# Patient Record
Sex: MALE | Race: WHITE | NOT HISPANIC OR LATINO
[De-identification: names, ages, dates, MRNs, and addresses within clinical notes are randomized per-mention and may not be internally consistent; named-entity substitution may affect disease eponyms.]

---

## 2017-07-06 ENCOUNTER — APPOINTMENT (OUTPATIENT)
Dept: PULMONOLOGY | Facility: CLINIC | Age: 72
End: 2017-07-06

## 2017-07-06 RX ORDER — AZITHROMYCIN 250 MG/1
250 TABLET, FILM COATED ORAL
Qty: 30 | Refills: 5 | Status: DISCONTINUED | COMMUNITY
Start: 2017-01-04 | End: 2017-07-06

## 2017-07-06 RX ORDER — AZITHROMYCIN 250 MG/1
250 TABLET, FILM COATED ORAL DAILY
Qty: 30 | Refills: 5 | Status: DISCONTINUED | COMMUNITY
Start: 2017-01-04 | End: 2017-07-06

## 2017-07-12 ENCOUNTER — APPOINTMENT (OUTPATIENT)
Dept: PULMONOLOGY | Facility: CLINIC | Age: 72
End: 2017-07-12

## 2017-08-03 ENCOUNTER — APPOINTMENT (OUTPATIENT)
Dept: PULMONOLOGY | Facility: CLINIC | Age: 72
End: 2017-08-03
Payer: MEDICARE

## 2017-08-03 VITALS
HEART RATE: 55 BPM | WEIGHT: 208 LBS | DIASTOLIC BLOOD PRESSURE: 84 MMHG | OXYGEN SATURATION: 89 % | BODY MASS INDEX: 26 KG/M2 | RESPIRATION RATE: 18 BRPM | SYSTOLIC BLOOD PRESSURE: 142 MMHG

## 2017-08-03 DIAGNOSIS — J44.1 CHRONIC OBSTRUCTIVE PULMONARY DISEASE WITH (ACUTE) EXACERBATION: ICD-10-CM

## 2017-08-03 PROCEDURE — 99214 OFFICE O/P EST MOD 30 MIN: CPT

## 2017-08-03 RX ORDER — ALBUTEROL SULFATE 2.5 MG/3ML
(2.5 MG/3ML) SOLUTION RESPIRATORY (INHALATION)
Qty: 0 | Refills: 0 | Status: COMPLETED | OUTPATIENT
Start: 2017-08-03

## 2017-08-03 RX ORDER — DEXAMETHASONE SODIUM PHOSPHATE 4 MG/ML
4 INJECTION, SOLUTION INTRAMUSCULAR; INTRAVENOUS
Qty: 0 | Refills: 0 | Status: COMPLETED | OUTPATIENT
Start: 2017-08-03

## 2017-08-03 RX ADMIN — Medication 0 MG/ML: at 00:00

## 2017-08-03 RX ADMIN — ALBUTEROL SULFATE 0 0.083%: 2.5 SOLUTION RESPIRATORY (INHALATION) at 00:00

## 2017-10-06 ENCOUNTER — APPOINTMENT (OUTPATIENT)
Dept: PULMONOLOGY | Facility: CLINIC | Age: 72
End: 2017-10-06
Payer: MEDICARE

## 2017-10-12 ENCOUNTER — APPOINTMENT (OUTPATIENT)
Dept: PULMONOLOGY | Facility: CLINIC | Age: 72
End: 2017-10-12
Payer: MEDICARE

## 2017-10-12 VITALS — HEART RATE: 58 BPM | OXYGEN SATURATION: 90 %

## 2017-10-12 VITALS — WEIGHT: 216 LBS | SYSTOLIC BLOOD PRESSURE: 150 MMHG | DIASTOLIC BLOOD PRESSURE: 90 MMHG | BODY MASS INDEX: 27 KG/M2

## 2017-10-12 DIAGNOSIS — R05 COUGH: ICD-10-CM

## 2017-10-12 PROCEDURE — 99406 BEHAV CHNG SMOKING 3-10 MIN: CPT

## 2017-10-12 PROCEDURE — 99214 OFFICE O/P EST MOD 30 MIN: CPT | Mod: 25

## 2018-03-07 ENCOUNTER — APPOINTMENT (OUTPATIENT)
Dept: PULMONOLOGY | Facility: CLINIC | Age: 73
End: 2018-03-07

## 2018-06-07 NOTE — H&P ADULT - ASSESSMENT
72 year old female with PMHx COPD, home O2, current smoker, HTN, CAD referred for cardiac cath and possible intervention for abnormal stress test which revealed mid to basal reversible anterior defect consistent with ischemia.  Risks and benefits explained  Informed consent signed by pt

## 2018-06-07 NOTE — H&P ADULT - PMH
Carotid atherosclerosis, unspecified laterality    COPD (chronic obstructive pulmonary disease)    Dyslipidemia    Essential hypertension

## 2018-06-07 NOTE — H&P ADULT - NSHPPHYSICALEXAM_GEN_ALL_CORE
PHYSICAL EXAM:    Constitutional: NAD, well-groomed, well-developed  Neuro: Alert and oriented x 3 Gait steady Speech clear No focal deficits  Neck: No JVD No bruit  Respiratory: CTAB, decreased at bases, + cough  Cardiovascular: S1 and S2, RRR,   Gastrointestinal: BS+, soft, NT/ND  Extremities: No clubbing cyanosis or edema No varicosities  Vascular: 2+ peripheral pulses  Psychiatric: Normal mood, normal affect  Musculoskeletal: 5/5 strength b/l upper and lower extremities

## 2018-06-07 NOTE — H&P ADULT - HISTORY OF PRESENT ILLNESS
72 year old female with PMHx COPD, HTN, CAD 72 year old female with PMHx COPD, home O2, HTN, CAD 72 year old female with PMHx COPD, home O2, current smoker, HTN, CAD referred for cardiac cath and possible intervention for abnormal stress test which revealed mid to basal reversible anterior defect consistent with ischemia.

## 2018-06-07 NOTE — H&P ADULT - NSHPLABSRESULTS_GEN_ALL_CORE
16.5   7.49  )-----------( 257      ( 08 Jun 2018 07:30 )             48.3     06-08    143  |  110<H>  |  18  ----------------------------<  96  4.2   |  27  |  0.93    Ca    8.5      08 Jun 2018 07:30    PET: stress test which revealed mid to basal reversible anterior defect consistent with ischemia.    EKG:

## 2018-06-07 NOTE — H&P ADULT - NSHPREVIEWOFSYSTEMS_GEN_ALL_CORE
REVIEW OF SYSTEMS:    CONSTITUTIONAL: No weakness, fevers or chills  EYES/ENT: No visual changes;  No vertigo or throat pain   NECK: No pain or stiffness  RESPIRATORY: No cough, wheezing, hemoptysis; +shortness of breath  CARDIOVASCULAR: No chest pain or palpitations  GASTROINTESTINAL: No abdominal or epigastric pain. No nausea, vomiting, or hematemesis; No diarrhea or constipation. No melena or hematochezia.  GENITOURINARY: No dysuria, frequency or hematuria  NEUROLOGICAL: No numbness or weakness  SKIN: No itching, burning, rashes, or lesions   All other review of systems is negative unless indicated above.

## 2018-06-08 ENCOUNTER — OUTPATIENT (OUTPATIENT)
Dept: OUTPATIENT SERVICES | Facility: HOSPITAL | Age: 73
LOS: 1 days | Discharge: HOME | End: 2018-06-08

## 2018-06-08 VITALS
HEART RATE: 56 BPM | RESPIRATION RATE: 16 BRPM | SYSTOLIC BLOOD PRESSURE: 176 MMHG | OXYGEN SATURATION: 96 % | TEMPERATURE: 97 F | DIASTOLIC BLOOD PRESSURE: 90 MMHG

## 2018-06-08 VITALS
HEART RATE: 65 BPM | SYSTOLIC BLOOD PRESSURE: 152 MMHG | RESPIRATION RATE: 16 BRPM | DIASTOLIC BLOOD PRESSURE: 85 MMHG | OXYGEN SATURATION: 96 %

## 2018-06-08 DIAGNOSIS — H26.9 UNSPECIFIED CATARACT: Chronic | ICD-10-CM

## 2018-06-08 LAB
ANION GAP SERPL CALC-SCNC: 6 MMOL/L — SIGNIFICANT CHANGE UP (ref 5–17)
BUN SERPL-MCNC: 18 MG/DL — SIGNIFICANT CHANGE UP (ref 7–23)
CALCIUM SERPL-MCNC: 8.5 MG/DL — SIGNIFICANT CHANGE UP (ref 8.5–10.1)
CHLORIDE SERPL-SCNC: 110 MMOL/L — HIGH (ref 96–108)
CO2 SERPL-SCNC: 27 MMOL/L — SIGNIFICANT CHANGE UP (ref 22–31)
CREAT SERPL-MCNC: 0.93 MG/DL — SIGNIFICANT CHANGE UP (ref 0.5–1.3)
GLUCOSE SERPL-MCNC: 96 MG/DL — SIGNIFICANT CHANGE UP (ref 70–99)
HCT VFR BLD CALC: 48.3 % — SIGNIFICANT CHANGE UP (ref 39–50)
HGB BLD-MCNC: 16.5 G/DL — SIGNIFICANT CHANGE UP (ref 13–17)
MCHC RBC-ENTMCNC: 29.7 PG — SIGNIFICANT CHANGE UP (ref 27–34)
MCHC RBC-ENTMCNC: 34.2 GM/DL — SIGNIFICANT CHANGE UP (ref 32–36)
MCV RBC AUTO: 86.9 FL — SIGNIFICANT CHANGE UP (ref 80–100)
NRBC # BLD: 0 /100 WBCS — SIGNIFICANT CHANGE UP (ref 0–0)
PLATELET # BLD AUTO: 257 K/UL — SIGNIFICANT CHANGE UP (ref 150–400)
POTASSIUM SERPL-MCNC: 4.2 MMOL/L — SIGNIFICANT CHANGE UP (ref 3.5–5.3)
POTASSIUM SERPL-SCNC: 4.2 MMOL/L — SIGNIFICANT CHANGE UP (ref 3.5–5.3)
RBC # BLD: 5.56 M/UL — SIGNIFICANT CHANGE UP (ref 4.2–5.8)
RBC # FLD: 13.7 % — SIGNIFICANT CHANGE UP (ref 10.3–14.5)
SODIUM SERPL-SCNC: 143 MMOL/L — SIGNIFICANT CHANGE UP (ref 135–145)
WBC # BLD: 7.49 K/UL — SIGNIFICANT CHANGE UP (ref 3.8–10.5)
WBC # FLD AUTO: 7.49 K/UL — SIGNIFICANT CHANGE UP (ref 3.8–10.5)

## 2018-06-08 RX ORDER — FENTANYL CITRATE 50 UG/ML
25 INJECTION INTRAVENOUS ONCE
Qty: 0 | Refills: 0 | Status: DISCONTINUED | OUTPATIENT
Start: 2018-06-08 | End: 2018-06-23

## 2018-06-08 RX ORDER — IPRATROPIUM/ALBUTEROL SULFATE 18-103MCG
3 AEROSOL WITH ADAPTER (GRAM) INHALATION ONCE
Qty: 0 | Refills: 0 | Status: COMPLETED | OUTPATIENT
Start: 2018-06-08 | End: 2018-06-08

## 2018-06-08 RX ORDER — HYDRALAZINE HCL 50 MG
10 TABLET ORAL ONCE
Qty: 0 | Refills: 0 | Status: COMPLETED | OUTPATIENT
Start: 2018-06-08 | End: 2018-06-08

## 2018-06-08 RX ADMIN — Medication 10 MILLIGRAM(S): at 11:38

## 2018-06-08 RX ADMIN — Medication 3 MILLILITER(S): at 08:22

## 2018-06-08 NOTE — PACU DISCHARGE NOTE - COMMENTS
VSS, AXOX4, able to ambulate in room with some SOB. Home o2 connected and in use. Adriana Saldaña NP in to see pt, Ok to discharge pt. Right groin site intact soft, without s/s of bleeding or hematoma. discharge instructions and discharge medications discussed with pt. Understanding noted through teach back. VSS, AXOX4, able to ambulate in room with some SOB. Home o2 connected and in use. Adriana Saldaña NP in to see pt, Ok to discharge pt. Right groin site intact soft, without s/s of bleeding or hematoma. discharge instructions and discharge medications discussed with pt. Understanding noted through teach back.escorted to lobby by transport team via wheelchair accompied by wife

## 2018-06-08 NOTE — PROGRESS NOTE ADULT - SUBJECTIVE AND OBJECTIVE BOX
s/p LHC revealing non obstructive CAD     Denies chest pain, dizziness or palpitations at this time. + SOB at rest  A+Ox3  CV: S1S2 reg  Respiratory: CTA. decreased breath sounds  Right groin procedure site CDI.  no bleeding, no hematoma, site soft, non tender, positive pedal pulses bilaterally      HPI:  72 year old female with PMHx COPD, home O2, current smoker, HTN, CAD referred for cardiac cath and possible intervention for abnormal stress test which revealed mid to basal reversible anterior defect consistent with ischemia. (07 Jun 2018 18:16)    now s/p LHC revealing non obstructive CAD    --vital signs, labs, diet and activity per post procedure orders  -ambulate ad sylvie post bedrest  -encourage PO fluids  -plan of care discussed with patient, family and MD  -d/c after bedrest if stable  -post procedure and d/c instructions reviewed  -follow up with MD in 1-2 weeks  -Discussed therapeutic lifestyle changes to reduce risk factors such as following a cardiac diet, weight loss, maintaining a healthy weight, exercise, smoking cessation, medication compliance, and regular follow-up  with MD

## 2018-06-13 DIAGNOSIS — F17.210 NICOTINE DEPENDENCE, CIGARETTES, UNCOMPLICATED: ICD-10-CM

## 2018-06-13 DIAGNOSIS — I20.0 UNSTABLE ANGINA: ICD-10-CM

## 2018-06-13 DIAGNOSIS — J44.9 CHRONIC OBSTRUCTIVE PULMONARY DISEASE, UNSPECIFIED: ICD-10-CM

## 2018-06-13 DIAGNOSIS — E78.5 HYPERLIPIDEMIA, UNSPECIFIED: ICD-10-CM

## 2018-06-13 DIAGNOSIS — I10 ESSENTIAL (PRIMARY) HYPERTENSION: ICD-10-CM

## 2018-06-13 DIAGNOSIS — R94.39 ABNORMAL RESULT OF OTHER CARDIOVASCULAR FUNCTION STUDY: ICD-10-CM

## 2018-07-18 PROBLEM — I65.29 OCCLUSION AND STENOSIS OF UNSPECIFIED CAROTID ARTERY: Chronic | Status: ACTIVE | Noted: 2018-06-08

## 2018-07-18 PROBLEM — I10 ESSENTIAL (PRIMARY) HYPERTENSION: Chronic | Status: ACTIVE | Noted: 2018-06-08

## 2018-07-18 PROBLEM — J44.9 CHRONIC OBSTRUCTIVE PULMONARY DISEASE, UNSPECIFIED: Chronic | Status: ACTIVE | Noted: 2018-06-08

## 2018-07-18 PROBLEM — E78.5 HYPERLIPIDEMIA, UNSPECIFIED: Chronic | Status: ACTIVE | Noted: 2018-06-08

## 2018-07-19 ENCOUNTER — MESSAGE (OUTPATIENT)
Age: 73
End: 2018-07-19

## 2018-08-07 ENCOUNTER — APPOINTMENT (OUTPATIENT)
Dept: PULMONOLOGY | Facility: CLINIC | Age: 73
End: 2018-08-07
Payer: MEDICARE

## 2018-08-07 VITALS — DIASTOLIC BLOOD PRESSURE: 60 MMHG | SYSTOLIC BLOOD PRESSURE: 128 MMHG

## 2018-08-07 PROCEDURE — 94060 EVALUATION OF WHEEZING: CPT

## 2018-08-07 PROCEDURE — 99214 OFFICE O/P EST MOD 30 MIN: CPT | Mod: 25

## 2018-08-07 PROCEDURE — 94727 GAS DIL/WSHOT DETER LNG VOL: CPT

## 2018-08-07 PROCEDURE — 85018 HEMOGLOBIN: CPT | Mod: QW

## 2018-08-07 PROCEDURE — 94664 DEMO&/EVAL PT USE INHALER: CPT | Mod: 59

## 2018-08-07 PROCEDURE — 94729 DIFFUSING CAPACITY: CPT

## 2018-08-07 RX ORDER — RANITIDINE HYDROCHLORIDE 150 MG/1
150 CAPSULE ORAL
Qty: 1 | Refills: 5 | Status: ACTIVE | COMMUNITY
Start: 2018-08-07 | End: 1900-01-01

## 2018-08-10 RX ORDER — AZITHROMYCIN 250 MG/1
250 TABLET, FILM COATED ORAL DAILY
Qty: 45 | Refills: 3 | Status: DISCONTINUED | COMMUNITY
Start: 2017-10-12 | End: 2018-08-10

## 2018-08-10 RX ORDER — AZITHROMYCIN 250 MG/1
250 TABLET, FILM COATED ORAL
Qty: 30 | Refills: 3 | Status: DISCONTINUED | COMMUNITY
Start: 2017-08-03 | End: 2018-08-10

## 2018-10-10 ENCOUNTER — APPOINTMENT (OUTPATIENT)
Dept: PULMONOLOGY | Facility: CLINIC | Age: 73
End: 2018-10-10
Payer: MEDICARE

## 2018-10-10 VITALS — HEART RATE: 105 BPM | OXYGEN SATURATION: 90 % | DIASTOLIC BLOOD PRESSURE: 70 MMHG | SYSTOLIC BLOOD PRESSURE: 138 MMHG

## 2018-10-10 VITALS — WEIGHT: 205 LBS | HEIGHT: 75 IN | BODY MASS INDEX: 25.49 KG/M2

## 2018-10-10 DIAGNOSIS — K21.9 GASTRO-ESOPHAGEAL REFLUX DISEASE W/OUT ESOPHAGITIS: ICD-10-CM

## 2018-10-10 PROCEDURE — 99214 OFFICE O/P EST MOD 30 MIN: CPT

## 2018-10-10 RX ORDER — HYDROCODONE BITARTRATE AND HOMATROPINE METHYLBROMIDE 5; 1.5 MG/5ML; MG/5ML
5-1.5 SYRUP ORAL
Qty: 240 | Refills: 0 | Status: DISCONTINUED | COMMUNITY
Start: 2018-08-10 | End: 2018-10-10

## 2018-10-10 RX ORDER — HYDROCODONE BITARTRATE AND HOMATROPINE METHYLBROMIDE 5; 1.5 MG/5ML; MG/5ML
5-1.5 SYRUP ORAL
Qty: 240 | Refills: 0 | Status: ACTIVE | COMMUNITY
Start: 2018-10-10 | End: 1900-01-01

## 2018-10-10 RX ORDER — AZITHROMYCIN 250 MG/1
250 TABLET, FILM COATED ORAL DAILY
Qty: 7 | Refills: 0 | Status: DISCONTINUED | COMMUNITY
Start: 2018-08-07 | End: 2018-10-10

## 2018-10-10 RX ORDER — FLUTICASONE FUROATE, UMECLIDINIUM BROMIDE AND VILANTEROL TRIFENATATE 100; 62.5; 25 UG/1; UG/1; UG/1
100-62.5-25 POWDER RESPIRATORY (INHALATION) DAILY
Qty: 3 | Refills: 1 | Status: ACTIVE | COMMUNITY
Start: 2018-10-10 | End: 1900-01-01

## 2018-10-10 RX ORDER — PREDNISONE 10 MG/1
10 TABLET ORAL
Qty: 30 | Refills: 2 | Status: ACTIVE | COMMUNITY
Start: 2018-10-10 | End: 1900-01-01

## 2018-10-10 RX ORDER — METHYLPREDNISOLONE 4 MG/1
4 TABLET ORAL
Qty: 15 | Refills: 1 | Status: DISCONTINUED | COMMUNITY
Start: 2018-08-07 | End: 2018-10-10

## 2018-10-10 RX ORDER — PREDNISONE 10 MG/1
10 TABLET ORAL
Qty: 30 | Refills: 2 | Status: DISCONTINUED | COMMUNITY
Start: 2017-08-03 | End: 2018-10-10

## 2018-11-08 ENCOUNTER — APPOINTMENT (OUTPATIENT)
Dept: PULMONOLOGY | Facility: CLINIC | Age: 73
End: 2018-11-08
Payer: MEDICARE

## 2018-11-08 VITALS
BODY MASS INDEX: 26.11 KG/M2 | OXYGEN SATURATION: 86 % | DIASTOLIC BLOOD PRESSURE: 70 MMHG | WEIGHT: 210 LBS | SYSTOLIC BLOOD PRESSURE: 110 MMHG | HEIGHT: 75 IN | HEART RATE: 108 BPM

## 2018-11-08 DIAGNOSIS — F17.200 NICOTINE DEPENDENCE, UNSPECIFIED, UNCOMPLICATED: ICD-10-CM

## 2018-11-08 DIAGNOSIS — J44.9 CHRONIC OBSTRUCTIVE PULMONARY DISEASE, UNSPECIFIED: ICD-10-CM

## 2018-11-08 DIAGNOSIS — Z87.09 PERSONAL HISTORY OF OTHER DISEASES OF THE RESPIRATORY SYSTEM: ICD-10-CM

## 2018-11-08 DIAGNOSIS — R91.1 SOLITARY PULMONARY NODULE: ICD-10-CM

## 2018-11-08 DIAGNOSIS — J96.11 CHRONIC RESPIRATORY FAILURE WITH HYPOXIA: ICD-10-CM

## 2018-11-08 PROCEDURE — 99214 OFFICE O/P EST MOD 30 MIN: CPT

## 2018-11-08 RX ORDER — FLUTICASONE FUROATE, UMECLIDINIUM BROMIDE AND VILANTEROL TRIFENATATE 100; 62.5; 25 UG/1; UG/1; UG/1
100-62.5-25 POWDER RESPIRATORY (INHALATION) DAILY
Qty: 3 | Refills: 3 | Status: DISCONTINUED | COMMUNITY
Start: 2018-08-07 | End: 2018-11-08

## 2018-11-08 RX ORDER — LEVOFLOXACIN 500 MG/1
500 TABLET, FILM COATED ORAL DAILY
Qty: 14 | Refills: 1 | Status: DISCONTINUED | COMMUNITY
Start: 2018-10-10 | End: 2018-11-08

## 2018-11-08 RX ORDER — AZITHROMYCIN 250 MG/1
250 TABLET, FILM COATED ORAL DAILY
Qty: 2 | Refills: 2 | Status: ACTIVE | COMMUNITY
Start: 2018-11-08 | End: 1900-01-01

## 2018-11-08 RX ORDER — IPRATROPIUM BROMIDE AND ALBUTEROL SULFATE 2.5; .5 MG/3ML; MG/3ML
0.5-2.5 (3) SOLUTION RESPIRATORY (INHALATION) 4 TIMES DAILY
Qty: 1080 | Refills: 3 | Status: ACTIVE | COMMUNITY
Start: 2018-11-08 | End: 1900-01-01

## 2018-11-08 RX ORDER — PREDNISONE 10 MG/1
10 TABLET ORAL
Qty: 30 | Refills: 6 | Status: ACTIVE | COMMUNITY
Start: 2018-11-08 | End: 1900-01-01

## 2018-12-28 ENCOUNTER — APPOINTMENT (OUTPATIENT)
Dept: PULMONOLOGY | Facility: CLINIC | Age: 73
End: 2018-12-28

## 2019-07-16 ENCOUNTER — OUTPATIENT (OUTPATIENT)
Dept: OUTPATIENT SERVICES | Facility: HOSPITAL | Age: 74
LOS: 1 days | End: 2019-07-16
Payer: MEDICARE

## 2019-07-16 VITALS
TEMPERATURE: 98 F | HEART RATE: 57 BPM | SYSTOLIC BLOOD PRESSURE: 144 MMHG | OXYGEN SATURATION: 91 % | DIASTOLIC BLOOD PRESSURE: 96 MMHG | RESPIRATION RATE: 18 BRPM

## 2019-07-16 VITALS — HEIGHT: 75 IN | WEIGHT: 223.99 LBS

## 2019-07-16 DIAGNOSIS — Z01.818 ENCOUNTER FOR OTHER PREPROCEDURAL EXAMINATION: ICD-10-CM

## 2019-07-16 DIAGNOSIS — H26.9 UNSPECIFIED CATARACT: Chronic | ICD-10-CM

## 2019-07-16 DIAGNOSIS — R94.39 ABNORMAL RESULT OF OTHER CARDIOVASCULAR FUNCTION STUDY: ICD-10-CM

## 2019-07-16 LAB
ANION GAP SERPL CALC-SCNC: 14 MMOL/L — SIGNIFICANT CHANGE UP (ref 5–17)
APTT BLD: 27.5 SEC — SIGNIFICANT CHANGE UP (ref 27.5–36.3)
BUN SERPL-MCNC: 17 MG/DL — SIGNIFICANT CHANGE UP (ref 8–20)
CALCIUM SERPL-MCNC: 9.4 MG/DL — SIGNIFICANT CHANGE UP (ref 8.6–10.2)
CHLORIDE SERPL-SCNC: 106 MMOL/L — SIGNIFICANT CHANGE UP (ref 98–107)
CO2 SERPL-SCNC: 23 MMOL/L — SIGNIFICANT CHANGE UP (ref 22–29)
CREAT SERPL-MCNC: 1.1 MG/DL — SIGNIFICANT CHANGE UP (ref 0.5–1.3)
GLUCOSE SERPL-MCNC: 116 MG/DL — HIGH (ref 70–115)
HCT VFR BLD CALC: 43.8 % — SIGNIFICANT CHANGE UP (ref 39–50)
HGB BLD-MCNC: 14.1 G/DL — SIGNIFICANT CHANGE UP (ref 13–17)
INR BLD: 1.03 RATIO — SIGNIFICANT CHANGE UP (ref 0.88–1.16)
MCHC RBC-ENTMCNC: 27.1 PG — SIGNIFICANT CHANGE UP (ref 27–34)
MCHC RBC-ENTMCNC: 32.2 GM/DL — SIGNIFICANT CHANGE UP (ref 32–36)
MCV RBC AUTO: 84.2 FL — SIGNIFICANT CHANGE UP (ref 80–100)
PLATELET # BLD AUTO: 237 K/UL — SIGNIFICANT CHANGE UP (ref 150–400)
POTASSIUM SERPL-MCNC: 3.9 MMOL/L — SIGNIFICANT CHANGE UP (ref 3.5–5.3)
POTASSIUM SERPL-SCNC: 3.9 MMOL/L — SIGNIFICANT CHANGE UP (ref 3.5–5.3)
PROTHROM AB SERPL-ACNC: 11.8 SEC — SIGNIFICANT CHANGE UP (ref 10–12.9)
RBC # BLD: 5.2 M/UL — SIGNIFICANT CHANGE UP (ref 4.2–5.8)
RBC # FLD: 15.7 % — HIGH (ref 10.3–14.5)
SODIUM SERPL-SCNC: 143 MMOL/L — SIGNIFICANT CHANGE UP (ref 135–145)
WBC # BLD: 9.36 K/UL — SIGNIFICANT CHANGE UP (ref 3.8–10.5)
WBC # FLD AUTO: 9.36 K/UL — SIGNIFICANT CHANGE UP (ref 3.8–10.5)

## 2019-07-16 PROCEDURE — 93010 ELECTROCARDIOGRAM REPORT: CPT

## 2019-07-16 PROCEDURE — 85730 THROMBOPLASTIN TIME PARTIAL: CPT

## 2019-07-16 PROCEDURE — 85610 PROTHROMBIN TIME: CPT

## 2019-07-16 PROCEDURE — 80048 BASIC METABOLIC PNL TOTAL CA: CPT

## 2019-07-16 PROCEDURE — 36415 COLL VENOUS BLD VENIPUNCTURE: CPT

## 2019-07-16 PROCEDURE — 85027 COMPLETE CBC AUTOMATED: CPT

## 2019-07-16 PROCEDURE — 93005 ELECTROCARDIOGRAM TRACING: CPT

## 2019-07-16 PROCEDURE — 94640 AIRWAY INHALATION TREATMENT: CPT

## 2019-07-16 PROCEDURE — G0463: CPT

## 2019-07-16 RX ORDER — IPRATROPIUM/ALBUTEROL SULFATE 18-103MCG
3 AEROSOL WITH ADAPTER (GRAM) INHALATION EVERY 6 HOURS
Refills: 0 | Status: DISCONTINUED | OUTPATIENT
Start: 2019-07-16 | End: 2019-07-31

## 2019-07-16 RX ORDER — UMECLIDINIUM BROMIDE AND VILANTEROL TRIFENATATE 62.5; 25 UG/1; UG/1
1 POWDER RESPIRATORY (INHALATION)
Qty: 0 | Refills: 0 | DISCHARGE

## 2019-07-16 RX ORDER — ALBUTEROL 90 UG/1
1 AEROSOL, METERED ORAL EVERY 4 HOURS
Refills: 0 | Status: DISCONTINUED | OUTPATIENT
Start: 2019-07-16 | End: 2019-07-31

## 2019-07-16 RX ORDER — FLUTICASONE FUROATE AND VILANTEROL TRIFENATATE 100; 25 UG/1; UG/1
1 POWDER RESPIRATORY (INHALATION)
Qty: 0 | Refills: 0 | DISCHARGE

## 2019-07-16 RX ORDER — FUROSEMIDE 40 MG
1 TABLET ORAL
Qty: 0 | Refills: 0 | DISCHARGE

## 2019-07-16 RX ORDER — TIOTROPIUM BROMIDE 18 UG/1
1 CAPSULE ORAL; RESPIRATORY (INHALATION) DAILY
Refills: 0 | Status: DISCONTINUED | OUTPATIENT
Start: 2019-07-16 | End: 2019-07-31

## 2019-07-16 RX ADMIN — Medication 3 MILLILITER(S): at 14:10

## 2019-07-16 NOTE — H&P PST ADULT - NSICDXPASTMEDICALHX_GEN_ALL_CORE_FT
PAST MEDICAL HISTORY:  Carotid atherosclerosis, unspecified laterality     COPD (chronic obstructive pulmonary disease)     Dyslipidemia     Essential hypertension

## 2019-07-16 NOTE — H&P PST ADULT - NSICDXPROBLEM_GEN_ALL_CORE_FT
PROBLEM DIAGNOSES  Problem: Abnormal stress test  Assessment and Plan: [facility]

## 2019-07-16 NOTE — H&P PST ADULT - HISTORY OF PRESENT ILLNESS
72 yo male with history of hypertension, hyperlipidemia, CHF and middle lobe endobrochial lesion on O2 at home, Afib not on coagulation secondary to bleed on xarelto, here for PST for cardiac cath.  Patient recently in [facility] had  5/4/19  Nuclear stress test small  reversible apical septal defect consistent with ischemia.  EF 33%.   5/4/19  Mild AI, Mild to Moderate MR, Trace TR    Toprol  Losartan  Entresto 74 yo male with history of hypertension, hyperlipidemia, CHF and middle lobe endobrochial lesion on O2 at home, Afib not on coagulation secondary to bleed on xarelto, here for PST for cardiac cath.  Patient recently in [facility] had  5/4/19  Nuclear stress test small  reversible apical septal defect consistent with ischemia.  EF 33%.   5/4/19  Mild AI, Mild to Moderate MR, Trace TR    Anti anginal meds  Toprol  Losartan  Entresto

## 2019-07-19 ENCOUNTER — TRANSCRIPTION ENCOUNTER (OUTPATIENT)
Age: 74
End: 2019-07-19

## 2019-07-19 ENCOUNTER — OUTPATIENT (OUTPATIENT)
Dept: OUTPATIENT SERVICES | Facility: HOSPITAL | Age: 74
LOS: 1 days | End: 2019-07-19
Payer: MEDICARE

## 2019-07-19 VITALS
HEART RATE: 82 BPM | DIASTOLIC BLOOD PRESSURE: 86 MMHG | OXYGEN SATURATION: 95 % | RESPIRATION RATE: 18 BRPM | SYSTOLIC BLOOD PRESSURE: 119 MMHG

## 2019-07-19 VITALS
OXYGEN SATURATION: 95 % | HEIGHT: 75 IN | DIASTOLIC BLOOD PRESSURE: 89 MMHG | HEART RATE: 85 BPM | WEIGHT: 223.11 LBS | TEMPERATURE: 98 F | SYSTOLIC BLOOD PRESSURE: 145 MMHG | RESPIRATION RATE: 18 BRPM

## 2019-07-19 DIAGNOSIS — H26.9 UNSPECIFIED CATARACT: Chronic | ICD-10-CM

## 2019-07-19 DIAGNOSIS — I50.9 HEART FAILURE, UNSPECIFIED: ICD-10-CM

## 2019-07-19 PROCEDURE — C1887: CPT

## 2019-07-19 PROCEDURE — C1889: CPT

## 2019-07-19 PROCEDURE — 93460 R&L HRT ART/VENTRICLE ANGIO: CPT

## 2019-07-19 PROCEDURE — C1769: CPT

## 2019-07-19 RX ORDER — SPIRONOLACTONE 25 MG/1
12.5 TABLET, FILM COATED ORAL
Qty: 0 | Refills: 0 | DISCHARGE

## 2019-07-19 RX ORDER — LOSARTAN POTASSIUM 100 MG/1
1 TABLET, FILM COATED ORAL
Qty: 0 | Refills: 0 | DISCHARGE

## 2019-07-19 RX ORDER — METOPROLOL TARTRATE 50 MG
1 TABLET ORAL
Qty: 0 | Refills: 0 | DISCHARGE

## 2019-07-19 RX ORDER — FLUTICASONE PROPIONATE 50 MCG
1 SPRAY, SUSPENSION NASAL
Qty: 0 | Refills: 0 | DISCHARGE

## 2019-07-19 RX ORDER — DOCUSATE SODIUM 100 MG
1 CAPSULE ORAL
Qty: 0 | Refills: 0 | DISCHARGE

## 2019-07-19 RX ORDER — SERTRALINE 25 MG/1
1 TABLET, FILM COATED ORAL
Qty: 0 | Refills: 0 | DISCHARGE

## 2019-07-19 RX ORDER — GABAPENTIN 400 MG/1
1 CAPSULE ORAL
Qty: 0 | Refills: 0 | DISCHARGE

## 2019-07-19 RX ORDER — FUROSEMIDE 40 MG
1 TABLET ORAL
Qty: 0 | Refills: 0 | DISCHARGE

## 2019-07-19 RX ORDER — RANITIDINE HYDROCHLORIDE 150 MG/1
0 TABLET, FILM COATED ORAL
Qty: 0 | Refills: 0 | DISCHARGE

## 2019-07-19 RX ORDER — ATORVASTATIN CALCIUM 80 MG/1
1 TABLET, FILM COATED ORAL
Qty: 0 | Refills: 0 | DISCHARGE

## 2019-07-19 RX ORDER — SODIUM CHLORIDE 9 MG/ML
1000 INJECTION INTRAMUSCULAR; INTRAVENOUS; SUBCUTANEOUS
Refills: 0 | Status: DISCONTINUED | OUTPATIENT
Start: 2019-07-19 | End: 2019-08-09

## 2019-07-19 RX ORDER — ASPIRIN/CALCIUM CARB/MAGNESIUM 324 MG
1 TABLET ORAL
Qty: 0 | Refills: 0 | DISCHARGE

## 2019-07-19 RX ORDER — SACUBITRIL AND VALSARTAN 24; 26 MG/1; MG/1
1 TABLET, FILM COATED ORAL
Qty: 0 | Refills: 0 | DISCHARGE

## 2019-07-19 RX ORDER — FLUTICASONE FUROATE, UMECLIDINIUM BROMIDE AND VILANTEROL TRIFENATATE 200; 62.5; 25 UG/1; UG/1; UG/1
2 POWDER RESPIRATORY (INHALATION)
Qty: 0 | Refills: 0 | DISCHARGE

## 2019-07-19 NOTE — DISCHARGE NOTE PROVIDER - HOSPITAL COURSE
72 yo male with history of hypertension, hyperlipidemia, CHF and middle lobe endobrochial lesion on O2 at home, Afib not on coagulation secondary to bleed on xarelto, Presents today for  cardiac cath.  Patient recently in [facility] had    5/4/19  Nuclear stress test small  reversible apical septal defect consistent with ischemia.  EF 33%.     5/4/19  Mild AI, Mild to Moderate MR, Trace TR        s/p LHC/RHC via R brachial/ Radial site tolerated well     LM: Normal.    LAD, LCX,  RI, RCA,  Angiography showed minor luminal irregularities with no flow limiting lesions.    Pulmonary Artery (S/D/M): 49/20/37    Pulmonary Capillary Wedge: 23    Right Atrium (a/v/M): 18/18/15    Right Ventricle (s/edp): 46/18/     CO by Valdemar: 5.46    Valdemar cardiac index: 2.37        moderate Pulmonary Hypertension and elevation of filling pressures.     Severely reduced LV global systolic dysfunction    (LVEF=35%).     Non-Ischemic Cardiomyopathy.    a/p Cardiac cath with NICM     _ Post procedure orders and observations    _ Radial/ brachial sheath removal in 2 hours post procedure if hemostasis maintained     - Continue medical therapy     -. Primary ICD if no improvement of LVEF  with medical therapy     - Discharge later today
regular

## 2019-07-19 NOTE — DISCHARGE NOTE NURSING/CASE MANAGEMENT/SOCIAL WORK - NSDCDPATPORTLINK_GEN_ALL_CORE
You can access the [facility] Portal, offered by [facility], by registering with the following website: [email or web address]

## 2019-07-19 NOTE — PROGRESS NOTE ADULT - SUBJECTIVE AND OBJECTIVE BOX
Subjective:    Medications:  sodium chloride 0.9%. 1000 milliLiter(s) IV Continuous <Continuous>      PHYSICAL EXAM:  Vital Signs Last 24 Hrs  T(C): 36.6 (2019 07:24), Max: 36.6 (2019 07:24)  T(F): 97.8 (2019 07:24), Max: 97.8 (2019 07:24)  HR: 75 (2019 07:37) (75 - 85)  BP: 143/104 (2019 07:37) (143/104 - 145/89)  BP(mean): --  RR: 18 (2019 07:37) (18 - 18)  SpO2: 93% (2019 07:37) (93% - 95%)  Daily Height in cm: 190.5 (2019 07:24)    Daily   I&O's Detail      General: A/ox 3, No acute Distress  Neck: Supple, NO JVD  Cardiac: S1 S2, No M/R/G  Pulmonary: CTAB, Breathing unlabored, No Rhonchi/Rales/Wheezing  Abdomen: Soft, Non -tender, +BS   Extremities: No Rashes, No edema  Neuro: A/o x 3, No focal deficits  Psch: normal mood , normal affect    LABS:          Darren Matute M.D.  Referring Physician:  Darren Matute M.D.  INDICATIONS: Dilated cardiomyopathy.  HISTORY: 72 yo male with HTNHD, HLD and dilated CMP diagnosed on a recent  cardiac w/u at [facility] admission along with apical ischemia on a Bindu-MPI was  referred for a RandLHC/PCI. He also has XAF but not ob OAC due to bleeding  diathesis. The patient had chronic lung disease (on home oxygen). There  was a prior diagnosis of congestive heart failure. The patient has  hypertension and medication-treated dyslipidemia.  PROCEDURE:  --  Right heart catheterization.  --  Left heart catheterization with ventriculography.  --  Left coronary angiography.  --  Right coronary angiography.  TECHNIQUE: The risks and alternatives of the procedures and conscious  sedation were explained to the patient and informed consent was obtained.  Cardiac catheterization performed electively. Cath lab room 5.  Local anesthetic given. Right radial arteryaccess. Right brachial vein  access. Right heart catheterization. The procedure was performed utilizing  a catheter. Left heart catheterization. Ventriculography was performed.  Left coronary artery angiography. The vessel was injected utilizing a  catheter. Right coronary artery angiography. The vessel was injected  utilizing a catheter. RADIATION EXPOSURE: 6.2 min.  CONTRAST GIVEN: Omnipaque 87 ml.  MEDICATIONS GIVEN: Verapamil (Isoptin, Calan, Covera), 5 mg, IA. Heparin,  3500 units, IV. 1% Lidocaine, 5 ml, subcutaneously. 1% Lidocaine, 5 ml,  subcutaneously.  VENTRICLES: Global left ventricular function was severely depressed. EF  calculated by contrast ventriculography was 35 %.  CORONARY VESSELS: The coronary circulation is co-dominant.  LM:   --  LM: Normal.  LAD:   --  LAD: Angiography showed minor luminal irregularities with no  flow limiting lesions.  CX:   --  Circumflex: Angiography showed minor luminal irregularities with  no flow limiting lesions.  RI:   --  Ramus intermedius: Angiography showed minor luminal  irregularities with no flow limiting lesions.  RCA:   --  RCA: Angiography showed minor luminal irregularities with no  flow limiting lesions.  COMPLICATIONS: There were no complications. No complications occurred  during the cath lab visit.  DIAGNOSTIC IMPRESSIONS: Moderate Pulmonary Hypertension and elevation of  filling pressures. 2. Severely reduced LV global systolic dysfunction  (LVEF=35%). 3. Non-Ischemic Cardiomyopathy.  DIAGNOSTIC RECOMMENDATIONS: GDMT. 2. RFM. 3. Primary ICD if no improvement  of LVEF on GDMT.  INTERVENTIONAL IMPRESSIONS: Moderate Pulmonary Hypertension and elevation  of filling pressures. 2. Severely reduced LV global systolic dysfunction  (LVEF=35%). 3. Non-Ischemic Cardiomyopathy.  INTERVENTIONAL RECOMMENDATIONS: GDMT. 2. RFM. 3. Primary ICD if no  improvement of LVEF on GDMT.  Prepared and signed by  Darren Matute M.D.  Signed 2019 09:11:18  HEMODYNAMIC TABLES  Pressures:  NO PHASE  Pressures:  - HR: 77  Pressures:  - Rhythm:  Pressures:  -- Aortic Pressure (S/D/M): 151/104/123  Pressures:  -- Left Ventricle (s/edp): 141/18/--  Pressures:  Post Angio  Pressures:  - HR: 67  Pressures:  - Rhythm:  Pressures:  -- Aortic Pressure (S/D/M): 168/98/120  Pressures: -- Left Ventricle (s/edp): 166/20/--  Pressures:  -- Pulmonary Artery (S/D/M): 49/20/37  Pressures:  -- Pulmonary Capillary Wedge: 24/24/23  Pressures:  -- Right Atrium (a/v/M): 1818/15  Pressures:  -- Right Ventricle (s/edp): 46/18/--  O2 Sats:  Post Angio  O2 Sats:  - HR: 67  O2 Sats:  - Rhythm:  O2 Sats:  -- AO: 13.7/91.5/17.05  O2 Sats:  -- PA: 13.7/57.8/10.77  O2 Sats:  -- SVC: 13.7/63.2/11.78  Outputs:  NO PHASE  Outputs:  -- CALCULATIONS[age]  Outputs:  -- CALCULATIONS: Body Surface Area: 2.30  Outputs:  -- CALCULATIONS: Height in cm: 191.00  Outputs:  -- CALCULATIONS: Sex: Male  Outputs:  -- CALCULATIONS: Weight in k.20  Outputs:  -- OUTPUTS: O2 consumption: 287.89  Outputs:  -- OUTPUTS: Vo2 Indexed: 125.00  Outputs:  Post Angio  Outputs:  -- OUTPUTS: Blood Oxygen Difference: 5.27  Outputs:  -- OUTPUTS: CO by Valdemar: 5.46  Outputs:  -- OUTPUTS: Valdemar cardiac index: 2.37  Outputs:  -- OUTPUTS: O2 consumption: 287.89  Outputs:  -- OUTPUTS: Vo2 Indexed: 125.00  Outputs:  -- RESISTANCES: Left ventricular stroke work: 94.77  Outputs:  -- RESISTANCES: Left Ventricular Stroke Work index: 41.15  Outputs:  -- RESISTANCES: Pulmonary vascular index (dsc): 562.46  Outputs:  -- RESISTANCES: Pulmonary vascular index (Wood Units): 7.03  Outputs:  -- RESISTANCES: Pulmonary vascular resistance (dsc): 244.21  Outputs:  -- RESISTANCES: Pulmonary vascular resistance (Wood Units): 3.05  Outputs:  -- RESISTANCES: PVR_SVR Ratio: 0.16  Outputs:  -- RESISTANCES: Right ventricular stroke work: 20.48  Outputs:  -- RESISTANCES: Right ventricular stroke work index: 8.89  Outputs:  -- RESISTANCES: Systemic vascular index (dsc): 3542.47  Outputs:  -- RESISTANCES: Systemic vascular index (Wood Units): 44.29  Outputs:  -- RESISTANCES: Systemic vascular resistance (dsc): 1538.12  Outputs:  -- RESISTANCES: Systemic vascular resistance (Wood Units): 19.23  Outputs:  -- RESISTANCES: Total pulmonary index (dsc): 1486.49  Outputs:  -- RESISTANCES: Total pulmonary index (Wood Units):18.59  Outputs:  -- RESISTANCES: Total pulmonary resistance (dsc): 645.42  Outputs:  -- RESISTANCES: Total pulmonary resistance (Wood Units): 8.07  Outputs:  -- RESISTANCES: Total vascular index (Wood Units): 50.62  Outputs:  -- RESISTANCES: Total vascular resistance (dsc): 1757.85  Outputs:  -- RESISTANCES: Total vascular resistance (Wood Units): 21.98  Outputs:  -- RESISTANCES: Total vascular resistance index (dsc): 4048.54  Outputs:  -- RESISTANCES: TPR_TVR Ratio: 0.37  Outputs:  -- SHUNTS: Pulmonary flow: 4.58  Outputs:  -- SHUNTS: Qp Indexed: 1.99  Outputs:  -- SHUNTS: Qs Indexed: 2.37  Outputs:  -- SHUNTS: Systemic flow: 5.46 (19 @ 07:47)

## 2019-07-19 NOTE — DISCHARGE NOTE NURSING/CASE MANAGEMENT/SOCIAL WORK - NSDCPEPT PROEDHF_GEN_ALL_CORE
Monitor weight daily/Report signs and symptoms to primary care provider/Low salt diet/Call primary care provider for follow up after discharge/Activities as tolerated

## 2019-07-19 NOTE — DISCHARGE NOTE PROVIDER - CARE PROVIDER_API CALL
Darren Matute)  Cardiovascular Disease; Internal Medicine; Interventional Cardiology  [address] [place] [address]  Phone: [phone]  Fax: [phone]  Follow Up Time:

## 2019-07-19 NOTE — PROGRESS NOTE ADULT - SUBJECTIVE AND OBJECTIVE BOX
History of Present Illness:  History of Present Illness		  74 yo male with history of hypertension, hyperlipidemia, CHF and middle lobe endobrochial lesion on O2 at home, Afib not on coagulation secondary to bleed on xarelto, here for PST for cardiac cath.  Patient recently in [facility] had  5/4/19  Nuclear stress test small  reversible apical septal defect consistent with ischemia.  EF 33%.   5/4/19  Mild AI, Mild to Moderate MR, Trace TR    Anti anginal meds  Toprol  Losartan  Entresto   NPO>8 hours  Labs reviewed  Consent by Dr Ivan for R&L cardiac catheterization  IVF KVO  Aspirin at home today per pt          REVIEW OF SYSTEMS:  Denies SOB, CP, NV, HA, dizziness, palpitations,    PHYSICAL EXAM: A&Ox3 NAD Skin warm and dry  NEURO: Speech intact +gag +swallow Tongue midline MARION  NECK: No JVD, trachea midline. Eupneic  HEART:   PULMONARY:  CTA hans  ABDOMEN: Soft nontender X4 +BS Vdg

## 2019-07-19 NOTE — PROGRESS NOTE ADULT - ASSESSMENT
74 yo male with history of hypertension, hyperlipidemia, CHF and middle lobe endobrochial lesion on O2 at home, Afib not on coagulation secondary to bleed on xarelto, Presents today for  cardiac cath.  Patient recently in [facility] had  5/4/19  Nuclear stress test small  reversible apical septal defect consistent with ischemia.  EF 33%.   5/4/19  Mild AI, Mild to Moderate MR, Trace TR    s/p LHC/RHC via R brachial/ Radial site tolerated well   LM: Normal.  LAD, LCX,  RI, RCA,  Angiography showed minor luminal irregularities with no flow limiting lesions.  Pulmonary Artery (S/D/M): 49/20/37  Pulmonary Capillary Wedge: 23  Right Atrium (a/v/M): 18/18/15  Right Ventricle (s/edp): 46/18/   CO by Valdemar: 5.46  Valdemar cardiac index: 2.37    moderate Pulmonary Hypertension and elevation of filling pressures.   Severely reduced LV global systolic dysfunction  (LVEF=35%).   Non-Ischemic Cardiomyopathy.  a/p Cardiac cath with NICM   _ Post procedure orders and observations  _ Radial/ brachial sheath removal in 2 hours post procedure if hemostasis maintained   - Continue medical therapy   -. Primary ICD if no improvement of LVEF  with medical therapy   - Discharge later today

## 2021-05-19 NOTE — ASU PATIENT PROFILE, ADULT - VISION (WITH CORRECTIVE LENSES IF THE PATIENT USUALLY WEARS THEM):
Renee Smallwood  [address] [place] [address]  Phone: [phone]  Fax: (   )    -  Established Patient  Scheduled Appointment: 06/04/2021 09:30 AM  
Normal vision: sees adequately in most situations; can see medication labels, newsprint

## 2022-08-17 NOTE — ASU PATIENT PROFILE, ADULT - CIGARETTES, PACKS/DAY
Pam Beebe calling with [facility] to state that certain CPT codes were not covered by Shabana Gastelum.  CPT codes 209.36, 209.34, 209.33, 209.32 were taken off and Pam Beebe would like a call back to discuss, [phone] 0.5
